# Patient Record
Sex: MALE | Race: WHITE | Employment: OTHER | URBAN - METROPOLITAN AREA
[De-identification: names, ages, dates, MRNs, and addresses within clinical notes are randomized per-mention and may not be internally consistent; named-entity substitution may affect disease eponyms.]

---

## 2017-01-06 PROBLEM — M65.331 TRIGGER FINGER, RIGHT MIDDLE FINGER: Status: ACTIVE | Noted: 2017-01-06

## 2017-01-30 PROBLEM — Z90.79 STATUS POST PROSTATECTOMY: Status: ACTIVE | Noted: 2017-01-30

## 2017-01-30 PROBLEM — Z85.46 HISTORY OF PROSTATE CANCER: Status: ACTIVE | Noted: 2017-01-30

## 2017-02-17 PROBLEM — R42 VERTIGO: Status: ACTIVE | Noted: 2017-02-17

## 2017-06-01 PROBLEM — M65.331 TRIGGER FINGER, RIGHT MIDDLE FINGER: Status: RESOLVED | Noted: 2017-01-06 | Resolved: 2017-06-01

## 2017-06-01 PROBLEM — Z85.46 HISTORY OF PROSTATE CANCER: Status: RESOLVED | Noted: 2017-01-30 | Resolved: 2017-06-01

## 2017-07-12 PROCEDURE — 87177 OVA AND PARASITES SMEARS: CPT | Performed by: INTERNAL MEDICINE

## 2017-07-12 PROCEDURE — 87269 GIARDIA AG IF: CPT | Performed by: INTERNAL MEDICINE

## 2017-07-12 PROCEDURE — 87046 STOOL CULTR AEROBIC BACT EA: CPT | Performed by: INTERNAL MEDICINE

## 2017-07-12 PROCEDURE — 87209 SMEAR COMPLEX STAIN: CPT | Performed by: INTERNAL MEDICINE

## 2017-07-12 PROCEDURE — 87045 FECES CULTURE AEROBIC BACT: CPT | Performed by: INTERNAL MEDICINE

## 2017-07-12 PROCEDURE — 87427 SHIGA-LIKE TOXIN AG IA: CPT | Performed by: INTERNAL MEDICINE

## 2018-03-08 PROBLEM — M75.102 ROTATOR CUFF SYNDROME OF LEFT SHOULDER: Status: ACTIVE | Noted: 2018-03-08

## 2019-03-11 PROCEDURE — 86803 HEPATITIS C AB TEST: CPT | Performed by: FAMILY MEDICINE

## 2019-04-03 PROCEDURE — 84403 ASSAY OF TOTAL TESTOSTERONE: CPT | Performed by: UROLOGY

## 2019-04-03 PROCEDURE — 36415 COLL VENOUS BLD VENIPUNCTURE: CPT | Performed by: UROLOGY

## 2019-04-03 PROCEDURE — 84402 ASSAY OF FREE TESTOSTERONE: CPT | Performed by: UROLOGY

## 2019-04-05 PROBLEM — E78.00 PURE HYPERCHOLESTEROLEMIA: Status: ACTIVE | Noted: 2019-04-05

## 2019-04-08 PROBLEM — N39.46 MIXED INCONTINENCE URGE AND STRESS (MALE)(FEMALE): Status: ACTIVE | Noted: 2019-04-08

## 2019-04-19 PROCEDURE — 84153 ASSAY OF PSA TOTAL: CPT | Performed by: RADIOLOGY

## 2019-04-19 PROCEDURE — 36415 COLL VENOUS BLD VENIPUNCTURE: CPT | Performed by: RADIOLOGY

## 2019-09-16 PROCEDURE — 84153 ASSAY OF PSA TOTAL: CPT | Performed by: RADIOLOGY

## 2019-09-16 PROCEDURE — 36415 COLL VENOUS BLD VENIPUNCTURE: CPT | Performed by: RADIOLOGY

## 2019-10-21 PROBLEM — M54.2 NECK PAIN: Status: ACTIVE | Noted: 2019-10-21

## 2019-12-18 PROCEDURE — 88305 TISSUE EXAM BY PATHOLOGIST: CPT | Performed by: INTERNAL MEDICINE

## 2021-05-08 ENCOUNTER — LAB ENCOUNTER (OUTPATIENT)
Dept: LAB | Facility: HOSPITAL | Age: 71
End: 2021-05-08
Attending: INTERNAL MEDICINE
Payer: MEDICARE

## 2021-05-08 DIAGNOSIS — Z45.010 PACEMAKER AT END OF BATTERY LIFE: ICD-10-CM

## 2021-05-11 ENCOUNTER — HOSPITAL ENCOUNTER (OUTPATIENT)
Dept: INTERVENTIONAL RADIOLOGY/VASCULAR | Facility: HOSPITAL | Age: 71
Discharge: HOME | End: 2021-05-11
Attending: INTERNAL MEDICINE | Admitting: INTERNAL MEDICINE
Payer: MEDICARE

## 2021-05-11 VITALS
RESPIRATION RATE: 16 BRPM | HEART RATE: 63 BPM | SYSTOLIC BLOOD PRESSURE: 154 MMHG | BODY MASS INDEX: 31.83 KG/M2 | WEIGHT: 235 LBS | HEIGHT: 72 IN | DIASTOLIC BLOOD PRESSURE: 73 MMHG | TEMPERATURE: 97 F | OXYGEN SATURATION: 99 %

## 2021-05-11 DIAGNOSIS — Z45.010 PACEMAKER AT END OF BATTERY LIFE: Primary | ICD-10-CM

## 2021-05-11 PROCEDURE — 99153 MOD SED SAME PHYS/QHP EA: CPT

## 2021-05-11 PROCEDURE — 0JPT0PZ REMOVAL OF CARDIAC RHYTHM RELATED DEVICE FROM TRUNK SUBCUTANEOUS TISSUE AND FASCIA, OPEN APPROACH: ICD-10-PCS | Performed by: INTERNAL MEDICINE

## 2021-05-11 PROCEDURE — 33228 REMV&REPLC PM GEN DUAL LEAD: CPT

## 2021-05-11 PROCEDURE — 0JH606Z INSERTION OF PACEMAKER, DUAL CHAMBER INTO CHEST SUBCUTANEOUS TISSUE AND FASCIA, OPEN APPROACH: ICD-10-PCS | Performed by: INTERNAL MEDICINE

## 2021-05-11 PROCEDURE — 99152 MOD SED SAME PHYS/QHP 5/>YRS: CPT

## 2021-05-11 RX ORDER — VANCOMYCIN HYDROCHLORIDE 1 G/20ML
INJECTION, POWDER, LYOPHILIZED, FOR SOLUTION INTRAVENOUS
Status: COMPLETED
Start: 2021-05-11 | End: 2021-05-11

## 2021-05-11 RX ORDER — CEFAZOLIN SODIUM/WATER 2 G/20 ML
SYRINGE (ML) INTRAVENOUS
Status: COMPLETED
Start: 2021-05-11 | End: 2021-05-11

## 2021-05-11 RX ORDER — CEFAZOLIN SODIUM/WATER 2 G/20 ML
2 SYRINGE (ML) INTRAVENOUS EVERY 8 HOURS
Status: DISCONTINUED | OUTPATIENT
Start: 2021-05-11 | End: 2021-05-11

## 2021-05-11 RX ORDER — MIDAZOLAM HYDROCHLORIDE 1 MG/ML
INJECTION INTRAMUSCULAR; INTRAVENOUS
Status: COMPLETED
Start: 2021-05-11 | End: 2021-05-11

## 2021-05-11 RX ORDER — CEFAZOLIN SODIUM/WATER 2 G/20 ML
2 SYRINGE (ML) INTRAVENOUS
Status: DISCONTINUED | OUTPATIENT
Start: 2021-05-11 | End: 2021-05-11 | Stop reason: HOSPADM

## 2021-05-11 RX ORDER — LIDOCAINE HYDROCHLORIDE 10 MG/ML
INJECTION, SOLUTION EPIDURAL; INFILTRATION; INTRACAUDAL; PERINEURAL
Status: COMPLETED
Start: 2021-05-11 | End: 2021-05-11

## 2021-05-11 RX ORDER — CHLORHEXIDINE GLUCONATE 4 G/100ML
30 SOLUTION TOPICAL
Status: COMPLETED | OUTPATIENT
Start: 2021-05-12 | End: 2021-05-11

## 2021-05-11 RX ORDER — ONDANSETRON 2 MG/ML
4 INJECTION INTRAMUSCULAR; INTRAVENOUS EVERY 6 HOURS PRN
Status: DISCONTINUED | OUTPATIENT
Start: 2021-05-11 | End: 2021-05-11

## 2021-05-11 RX ORDER — SODIUM CHLORIDE 9 MG/ML
INJECTION, SOLUTION INTRAVENOUS
Status: COMPLETED | OUTPATIENT
Start: 2021-05-12 | End: 2021-05-11

## 2021-05-11 RX ADMIN — CHLORHEXIDINE GLUCONATE 30 ML: 4 SOLUTION TOPICAL at 11:20:00

## 2021-05-11 RX ADMIN — SODIUM CHLORIDE: 9 INJECTION, SOLUTION INTRAVENOUS at 11:20:00

## 2021-05-11 NOTE — PLAN OF CARE
Pt post PPM generator change. Aquacel dressing to left upper chest c/d/i. VSS. Pt A&O x4, tolerating PO. Discharge instructions explained to pt & wife, verbalized understanding. Pt taken to [facility] entrance via wheelchair w/ all belongings on discharge.

## 2021-05-11 NOTE — PROCEDURES
Pacemaker Generator Change      History:  66year old male with sick sinus syndrome s/p pacemaker now at [facility] who presents for generator change.  The risks (including, but not limited to, hematoma, infection, pneumothorax, tamponade, renal failure from IV dy

## 2021-05-11 NOTE — H&P
Pre-op Diagnosis: * No pre-op diagnosis entered *    The above referenced H&P  (5/6/21 by me)was reviewed by Jaquan Wood MD on 5/11/2021, the patient was examined and no significant changes have occurred in the patient's condition since the H&P was

## 2021-05-18 PROBLEM — M25.551 RIGHT HIP PAIN: Status: ACTIVE | Noted: 2021-05-18

## 2021-09-27 ENCOUNTER — ORDER TRANSCRIPTION (OUTPATIENT)
Dept: ADMINISTRATIVE | Facility: HOSPITAL | Age: 71
End: 2021-09-27

## 2021-09-27 ENCOUNTER — LAB ENCOUNTER (OUTPATIENT)
Dept: LAB | Age: 71
End: 2021-09-27
Attending: PHYSICAL MEDICINE & REHABILITATION
Payer: MEDICARE

## 2021-09-27 DIAGNOSIS — Z01.818 PRE-PROCEDURAL EXAMINATION: ICD-10-CM

## 2021-09-27 DIAGNOSIS — Z11.59 SCREENING FOR VIRAL DISEASE: ICD-10-CM

## 2021-09-27 DIAGNOSIS — Z01.818 PRE-PROCEDURAL EXAMINATION: Primary | ICD-10-CM

## 2021-09-28 ENCOUNTER — HOSPITAL ENCOUNTER (OUTPATIENT)
Dept: GENERAL RADIOLOGY | Facility: HOSPITAL | Age: 71
Discharge: HOME | End: 2021-09-28
Attending: PHYSICAL MEDICINE & REHABILITATION
Payer: MEDICARE

## 2021-09-28 ENCOUNTER — HOSPITAL ENCOUNTER (OUTPATIENT)
Dept: CT IMAGING | Facility: HOSPITAL | Age: 71
Discharge: HOME | End: 2021-09-28
Attending: PHYSICAL MEDICINE & REHABILITATION
Payer: MEDICARE

## 2021-09-28 ENCOUNTER — NURSE ONLY (OUTPATIENT)
Dept: LAB | Facility: HOSPITAL | Age: 71
End: 2021-09-28
Attending: PHYSICAL MEDICINE & REHABILITATION
Payer: MEDICARE

## 2021-09-28 VITALS
TEMPERATURE: 97 F | OXYGEN SATURATION: 97 % | HEART RATE: 65 BPM | DIASTOLIC BLOOD PRESSURE: 91 MMHG | SYSTOLIC BLOOD PRESSURE: 166 MMHG | RESPIRATION RATE: 18 BRPM

## 2021-09-28 DIAGNOSIS — G89.29 CHRONIC RIGHT-SIDED LOW BACK PAIN WITH RIGHT-SIDED SCIATICA: ICD-10-CM

## 2021-09-28 DIAGNOSIS — G89.29 CHRONIC LOW BACK PAIN: Primary | ICD-10-CM

## 2021-09-28 DIAGNOSIS — G89.29 CHRONIC LOW BACK PAIN: ICD-10-CM

## 2021-09-28 DIAGNOSIS — M54.50 CHRONIC LOW BACK PAIN: Primary | ICD-10-CM

## 2021-09-28 DIAGNOSIS — M54.50 CHRONIC LOW BACK PAIN: ICD-10-CM

## 2021-09-28 DIAGNOSIS — M54.41 CHRONIC RIGHT-SIDED LOW BACK PAIN WITH RIGHT-SIDED SCIATICA: ICD-10-CM

## 2021-09-28 DIAGNOSIS — M48.062 NEUROGENIC CLAUDICATION DUE TO LUMBAR SPINAL STENOSIS: ICD-10-CM

## 2021-09-28 PROCEDURE — 36415 COLL VENOUS BLD VENIPUNCTURE: CPT

## 2021-09-28 PROCEDURE — 85027 COMPLETE CBC AUTOMATED: CPT | Performed by: RADIOLOGY

## 2021-09-28 PROCEDURE — 85610 PROTHROMBIN TIME: CPT

## 2021-09-28 RX ORDER — DIAZEPAM 5 MG/1
TABLET ORAL
Status: COMPLETED
Start: 2021-09-28 | End: 2021-09-28

## 2021-09-28 RX ORDER — SODIUM CHLORIDE 9 MG/ML
INJECTION, SOLUTION INTRAVENOUS CONTINUOUS
Status: DISCONTINUED | OUTPATIENT
Start: 2021-09-28 | End: 2021-09-30

## 2021-09-28 RX ORDER — DIAZEPAM 5 MG/1
10 TABLET ORAL
Status: COMPLETED | OUTPATIENT
Start: 2021-09-28 | End: 2021-09-28

## 2021-09-28 RX ADMIN — DIAZEPAM 10 MG: 5 TABLET ORAL at 07:46:00

## 2021-09-28 NOTE — IMAGING NOTE
Pt scheduled for myelogram with Dr. Yecenia Conn. Took valium as ordered. Confirmed ride home. Report called to Naresh Bolaños in [employer].

## 2021-10-01 VITALS — BODY MASS INDEX: 31.15 KG/M2 | HEIGHT: 72 IN | WEIGHT: 230 LBS

## 2021-10-02 ENCOUNTER — LAB ENCOUNTER (OUTPATIENT)
Dept: LAB | Age: 71
End: 2021-10-02
Attending: PHYSICAL MEDICINE & REHABILITATION
Payer: MEDICARE

## 2021-10-02 DIAGNOSIS — Z11.59 ENCOUNTER FOR SCREENING FOR OTHER VIRAL DISEASES: ICD-10-CM

## 2021-10-02 DIAGNOSIS — Z01.818 PREOP EXAMINATION: ICD-10-CM

## 2021-10-04 ENCOUNTER — HOSPITAL ENCOUNTER (OUTPATIENT)
Dept: GENERAL RADIOLOGY | Facility: HOSPITAL | Age: 71
Discharge: HOME | End: 2021-10-04
Attending: PHYSICAL MEDICINE & REHABILITATION
Payer: MEDICARE

## 2021-10-04 ENCOUNTER — HOSPITAL ENCOUNTER (OUTPATIENT)
Dept: CT IMAGING | Facility: HOSPITAL | Age: 71
Discharge: HOME | End: 2021-10-04
Attending: PHYSICAL MEDICINE & REHABILITATION
Payer: MEDICARE

## 2021-10-04 ENCOUNTER — ANESTHESIA (OUTPATIENT)
Dept: GENERAL RADIOLOGY | Facility: HOSPITAL | Age: 71
End: 2021-10-04
Payer: MEDICARE

## 2021-10-04 ENCOUNTER — ANESTHESIA EVENT (OUTPATIENT)
Dept: GENERAL RADIOLOGY | Facility: HOSPITAL | Age: 71
End: 2021-10-04
Payer: MEDICARE

## 2021-10-04 VITALS
TEMPERATURE: 99 F | DIASTOLIC BLOOD PRESSURE: 81 MMHG | OXYGEN SATURATION: 96 % | BODY MASS INDEX: 31.83 KG/M2 | HEIGHT: 72 IN | HEART RATE: 65 BPM | RESPIRATION RATE: 18 BRPM | SYSTOLIC BLOOD PRESSURE: 159 MMHG | WEIGHT: 235 LBS

## 2021-10-04 DIAGNOSIS — M48.061 SPINAL STENOSIS OF LUMBAR REGION, UNSPECIFIED WHETHER NEUROGENIC CLAUDICATION PRESENT: ICD-10-CM

## 2021-10-04 DIAGNOSIS — G89.29 CHRONIC RIGHT-SIDED LOW BACK PAIN WITH RIGHT-SIDED SCIATICA: ICD-10-CM

## 2021-10-04 DIAGNOSIS — M50.30 DDD (DEGENERATIVE DISC DISEASE), CERVICAL: ICD-10-CM

## 2021-10-04 DIAGNOSIS — M54.41 CHRONIC RIGHT-SIDED LOW BACK PAIN WITH RIGHT-SIDED SCIATICA: ICD-10-CM

## 2021-10-04 PROCEDURE — 72132 CT LUMBAR SPINE W/DYE: CPT | Performed by: PHYSICAL MEDICINE & REHABILITATION

## 2021-10-04 PROCEDURE — 62304 MYELOGRAPHY LUMBAR INJECTION: CPT | Performed by: PHYSICAL MEDICINE & REHABILITATION

## 2021-10-04 RX ORDER — SODIUM CHLORIDE, SODIUM LACTATE, POTASSIUM CHLORIDE, CALCIUM CHLORIDE 600; 310; 30; 20 MG/100ML; MG/100ML; MG/100ML; MG/100ML
INJECTION, SOLUTION INTRAVENOUS CONTINUOUS
Status: DISCONTINUED | OUTPATIENT
Start: 2021-10-04 | End: 2021-10-06

## 2021-10-04 RX ORDER — ACETAMINOPHEN 500 MG
1000 TABLET ORAL ONCE
Status: DISCONTINUED | OUTPATIENT
Start: 2021-10-04 | End: 2021-10-06

## 2021-10-04 RX ORDER — HYDROCODONE BITARTRATE AND ACETAMINOPHEN 5; 325 MG/1; MG/1
1 TABLET ORAL AS NEEDED
Status: DISCONTINUED | OUTPATIENT
Start: 2021-10-04 | End: 2021-10-06

## 2021-10-04 RX ORDER — HYDROMORPHONE HYDROCHLORIDE 1 MG/ML
0.2 INJECTION, SOLUTION INTRAMUSCULAR; INTRAVENOUS; SUBCUTANEOUS EVERY 5 MIN PRN
Status: DISCONTINUED | OUTPATIENT
Start: 2021-10-04 | End: 2021-10-06

## 2021-10-04 RX ORDER — METOCLOPRAMIDE 10 MG/1
10 TABLET ORAL ONCE
Status: DISCONTINUED | OUTPATIENT
Start: 2021-10-04 | End: 2021-10-06

## 2021-10-04 RX ORDER — HYDROCODONE BITARTRATE AND ACETAMINOPHEN 5; 325 MG/1; MG/1
2 TABLET ORAL AS NEEDED
Status: DISCONTINUED | OUTPATIENT
Start: 2021-10-04 | End: 2021-10-06

## 2021-10-04 RX ORDER — HYDROMORPHONE HYDROCHLORIDE 1 MG/ML
0.4 INJECTION, SOLUTION INTRAMUSCULAR; INTRAVENOUS; SUBCUTANEOUS EVERY 5 MIN PRN
Status: DISCONTINUED | OUTPATIENT
Start: 2021-10-04 | End: 2021-10-06

## 2021-10-04 RX ORDER — MORPHINE SULFATE 4 MG/ML
2 INJECTION, SOLUTION INTRAMUSCULAR; INTRAVENOUS EVERY 10 MIN PRN
Status: DISCONTINUED | OUTPATIENT
Start: 2021-10-04 | End: 2021-10-06

## 2021-10-04 RX ORDER — LIDOCAINE HYDROCHLORIDE 10 MG/ML
INJECTION, SOLUTION EPIDURAL; INFILTRATION; INTRACAUDAL; PERINEURAL AS NEEDED
Status: DISCONTINUED | OUTPATIENT
Start: 2021-10-04 | End: 2021-10-04 | Stop reason: SURG

## 2021-10-04 RX ORDER — METOPROLOL TARTRATE 5 MG/5ML
2.5 INJECTION INTRAVENOUS ONCE
Status: DISCONTINUED | OUTPATIENT
Start: 2021-10-04 | End: 2021-10-06

## 2021-10-04 RX ORDER — MORPHINE SULFATE 4 MG/ML
4 INJECTION, SOLUTION INTRAMUSCULAR; INTRAVENOUS EVERY 10 MIN PRN
Status: DISCONTINUED | OUTPATIENT
Start: 2021-10-04 | End: 2021-10-06

## 2021-10-04 RX ORDER — HALOPERIDOL 5 MG/ML
0.25 INJECTION INTRAMUSCULAR ONCE AS NEEDED
Status: ACTIVE | OUTPATIENT
Start: 2021-10-04 | End: 2021-10-04

## 2021-10-04 RX ORDER — HYDROMORPHONE HYDROCHLORIDE 1 MG/ML
0.6 INJECTION, SOLUTION INTRAMUSCULAR; INTRAVENOUS; SUBCUTANEOUS EVERY 5 MIN PRN
Status: DISCONTINUED | OUTPATIENT
Start: 2021-10-04 | End: 2021-10-06

## 2021-10-04 RX ORDER — FAMOTIDINE 20 MG/1
20 TABLET ORAL ONCE
Status: DISCONTINUED | OUTPATIENT
Start: 2021-10-04 | End: 2021-10-06

## 2021-10-04 RX ORDER — MORPHINE SULFATE 10 MG/ML
6 INJECTION, SOLUTION INTRAMUSCULAR; INTRAVENOUS EVERY 10 MIN PRN
Status: DISCONTINUED | OUTPATIENT
Start: 2021-10-04 | End: 2021-10-06

## 2021-10-04 RX ORDER — ONDANSETRON 2 MG/ML
4 INJECTION INTRAMUSCULAR; INTRAVENOUS ONCE AS NEEDED
Status: ACTIVE | OUTPATIENT
Start: 2021-10-04 | End: 2021-10-04

## 2021-10-04 RX ORDER — NALOXONE HYDROCHLORIDE 0.4 MG/ML
80 INJECTION, SOLUTION INTRAMUSCULAR; INTRAVENOUS; SUBCUTANEOUS AS NEEDED
Status: ACTIVE | OUTPATIENT
Start: 2021-10-04 | End: 2021-10-04

## 2021-10-04 RX ORDER — PROCHLORPERAZINE EDISYLATE 5 MG/ML
5 INJECTION INTRAMUSCULAR; INTRAVENOUS ONCE AS NEEDED
Status: ACTIVE | OUTPATIENT
Start: 2021-10-04 | End: 2021-10-04

## 2021-10-04 RX ADMIN — SODIUM CHLORIDE, SODIUM LACTATE, POTASSIUM CHLORIDE, CALCIUM CHLORIDE: 600; 310; 30; 20 INJECTION, SOLUTION INTRAVENOUS at 13:36:00

## 2021-10-04 RX ADMIN — LIDOCAINE HYDROCHLORIDE 50 MG: 10 INJECTION, SOLUTION EPIDURAL; INFILTRATION; INTRACAUDAL; PERINEURAL at 13:42:00

## 2021-10-04 RX ADMIN — SODIUM CHLORIDE, SODIUM LACTATE, POTASSIUM CHLORIDE, CALCIUM CHLORIDE: 600; 310; 30; 20 INJECTION, SOLUTION INTRAVENOUS at 14:15:00

## 2021-10-04 RX ADMIN — SODIUM CHLORIDE, SODIUM LACTATE, POTASSIUM CHLORIDE, CALCIUM CHLORIDE: 600; 310; 30; 20 INJECTION, SOLUTION INTRAVENOUS at 14:20:00

## 2021-10-04 NOTE — ANESTHESIA POSTPROCEDURE EVALUATION
Patient: Alyssa Flores    Procedure Summary     Date: 10/04/21 Room / Location: [address] [place] [address]; [address] [place] [address] Anesthesia Care Unit    Anesthesia Start: 1112 Anesthesia Stop: 8250    Procedure: XR MYELOGRAM LUMBAR WITH INJECTION  (CPT=6230

## 2021-10-04 NOTE — ANESTHESIA PREPROCEDURE EVALUATION
Anesthesia PreOp Note    HPI:     Hazel Gomez is a 66year old male who presents for preoperative consultation requested by: * No surgeons listed *    Date of Surgery: 10/4/2021    * No procedures listed *  Indication: * No pre-op diagnosis entered * 12/10/2010      ED (erectile dysfunction)         Date Noted: 07/23/2010      Gout         Date Noted: 04/05/2010      Cardiac pacemaker Medtronic         Date Noted: 12/30/2008      Essential hypertension, benign         Date Noted: 12/30/2008      SSS (s SUBSTANCE, EPIDURAL/SUBARACHNOID; LUMBAR/SACRAL N/A 3/3/2016    Procedure: LUMBAR EPIDURAL;  Surgeon: Charyl Hatchet, MD;  Location: [facility]   • INJECTION, W/WO CONTRAST, DX/THERAPEUTIC SUBSTANCE, EPIDURAL/SUBARACHNOID; LUMBAR/SACR EVERY MORNING, Disp: 90 capsule, Rfl: 3  lisinopril 40 MG Oral Tab, TAKE 1 TABLET BY MOUTH EVERY DAY, Disp: 90 tablet, Rfl: 3  POTASSIUM CHLORIDE ER 8 MEQ Oral Tab CR, TAKE 1 TABLET(8 MEQ) BY MOUTH EVERY DAY, Disp: 90 tablet, Rfl: 3  HYDRALAZINE  MG age 79   • Heart Disorder Brother         CABG 35   • Heart Disorder Paternal Uncle         MI   • Diabetes Sister    • Hypertension Brother    • Hypertension Brother      Social History    Socioeconomic History      Marital status:       Spo file  Intimate Partner Violence:       Fear of Current or Ex-Partner: Not on file      Emotionally Abused: Not on file      Physically Abused: Not on file      Sexually Abused: Not on file  Housing Stability:       Unable to Pay for Housing in the [address] [place] [address] Drive valve: Trileaflet; mildly thickened, mildly calcified leaflets.      Peak velocity (S): 1.1m/sec. 3. Aorta: Aortic root dimension: 4.0cm (ED). Ascending aorta diameter: 4.0cm      (ED). 4. Mitral valve: Mildly calcified annulus.  There is mild regurgita

## 2021-12-06 PROBLEM — E78.00 PURE HYPERCHOLESTEROLEMIA: Status: RESOLVED | Noted: 2019-04-05 | Resolved: 2021-12-06

## 2021-12-20 ENCOUNTER — LAB ENCOUNTER (OUTPATIENT)
Dept: LAB | Age: 71
End: 2021-12-20
Attending: ORTHOPAEDIC SURGERY
Payer: MEDICARE

## 2021-12-20 DIAGNOSIS — Z01.818 PREOP TESTING: ICD-10-CM

## 2022-01-06 NOTE — H&P (VIEW-ONLY)
Patient: David Jarquin  Medical Record Number: [id number]  Referring Physician:  No ref.  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 Dre Iqbal.  Cyndra Lundborg, MD, Rogerio Adamson of CONTRAST, DX/THERAPEUTIC SUBSTANCE, EPIDURAL/SUBARACHNOID; LUMBAR/SACRAL N/A 3/3/2016    Procedure: LUMBAR EPIDURAL;  Surgeon: Alberto Malloy MD;  Location: [facility]   • INJECTION, W/WO CONTRAST, DX/THERAPEUTIC SUBSTANCE, EPIDURAL/ Current Medications:  Current Outpatient Medications   Medication Sig Dispense Refill   • amoxicillin 500 MG Oral Cap Take 1 capsule (500 mg total) by mouth 3 (three) times daily for 10 days.  30 capsule 0   • LABETALOL 200 MG Oral Tab TAKE 2 TABLETS(

## 2022-01-07 ENCOUNTER — LAB ENCOUNTER (OUTPATIENT)
Dept: LAB | Age: 72
End: 2022-01-07
Attending: ORTHOPAEDIC SURGERY
Payer: MEDICARE

## 2022-01-07 DIAGNOSIS — Z01.818 PRE-OP TESTING: ICD-10-CM

## 2022-01-08 LAB — SARS-COV-2 RNA RESP QL NAA+PROBE: NOT DETECTED

## 2022-01-10 ENCOUNTER — APPOINTMENT (OUTPATIENT)
Dept: GENERAL RADIOLOGY | Facility: HOSPITAL | Age: 72
End: 2022-01-10
Attending: ORTHOPAEDIC SURGERY
Payer: MEDICARE

## 2022-01-10 ENCOUNTER — ANESTHESIA (OUTPATIENT)
Dept: SURGERY | Facility: HOSPITAL | Age: 72
End: 2022-01-10
Payer: MEDICARE

## 2022-01-10 ENCOUNTER — HOSPITAL ENCOUNTER (OUTPATIENT)
Facility: HOSPITAL | Age: 72
Discharge: HOME | End: 2022-01-11
Attending: ORTHOPAEDIC SURGERY | Admitting: ORTHOPAEDIC SURGERY
Payer: MEDICARE

## 2022-01-10 ENCOUNTER — ANESTHESIA EVENT (OUTPATIENT)
Dept: SURGERY | Facility: HOSPITAL | Age: 72
End: 2022-01-10
Payer: MEDICARE

## 2022-01-10 DIAGNOSIS — M48.061 SPINAL STENOSIS OF LUMBAR REGION, UNSPECIFIED WHETHER NEUROGENIC CLAUDICATION PRESENT: ICD-10-CM

## 2022-01-10 DIAGNOSIS — Z01.818 PRE-OP TESTING: Primary | ICD-10-CM

## 2022-01-10 PROBLEM — R20.0 NUMBNESS AND TINGLING OF RIGHT LOWER EXTREMITY: Status: ACTIVE | Noted: 2022-01-10

## 2022-01-10 PROBLEM — R20.2 NUMBNESS AND TINGLING OF RIGHT LOWER EXTREMITY: Status: ACTIVE | Noted: 2022-01-10

## 2022-01-10 PROCEDURE — 97530 THERAPEUTIC ACTIVITIES: CPT

## 2022-01-10 PROCEDURE — 01NB0ZZ RELEASE LUMBAR NERVE, OPEN APPROACH: ICD-10-PCS | Performed by: ORTHOPAEDIC SURGERY

## 2022-01-10 PROCEDURE — 97162 PT EVAL MOD COMPLEX 30 MIN: CPT

## 2022-01-10 PROCEDURE — 76000 FLUOROSCOPY <1 HR PHYS/QHP: CPT | Performed by: ORTHOPAEDIC SURGERY

## 2022-01-10 RX ORDER — METOCLOPRAMIDE 10 MG/1
10 TABLET ORAL ONCE
Status: COMPLETED | OUTPATIENT
Start: 2022-01-10 | End: 2022-01-10

## 2022-01-10 RX ORDER — ROCURONIUM BROMIDE 10 MG/ML
INJECTION, SOLUTION INTRAVENOUS AS NEEDED
Status: DISCONTINUED | OUTPATIENT
Start: 2022-01-10 | End: 2022-01-10 | Stop reason: SURG

## 2022-01-10 RX ORDER — MORPHINE SULFATE 10 MG/ML
6 INJECTION, SOLUTION INTRAMUSCULAR; INTRAVENOUS EVERY 10 MIN PRN
Status: DISCONTINUED | OUTPATIENT
Start: 2022-01-10 | End: 2022-01-10 | Stop reason: HOSPADM

## 2022-01-10 RX ORDER — NALOXONE HYDROCHLORIDE 0.4 MG/ML
80 INJECTION, SOLUTION INTRAMUSCULAR; INTRAVENOUS; SUBCUTANEOUS AS NEEDED
Status: DISCONTINUED | OUTPATIENT
Start: 2022-01-10 | End: 2022-01-10 | Stop reason: HOSPADM

## 2022-01-10 RX ORDER — MELATONIN
2000 DAILY
Status: DISCONTINUED | OUTPATIENT
Start: 2022-01-11 | End: 2022-01-11

## 2022-01-10 RX ORDER — MORPHINE SULFATE 4 MG/ML
2 INJECTION, SOLUTION INTRAMUSCULAR; INTRAVENOUS EVERY 10 MIN PRN
Status: DISCONTINUED | OUTPATIENT
Start: 2022-01-10 | End: 2022-01-10 | Stop reason: HOSPADM

## 2022-01-10 RX ORDER — HYDROMORPHONE HYDROCHLORIDE 1 MG/ML
0.6 INJECTION, SOLUTION INTRAMUSCULAR; INTRAVENOUS; SUBCUTANEOUS EVERY 5 MIN PRN
Status: DISCONTINUED | OUTPATIENT
Start: 2022-01-10 | End: 2022-01-10 | Stop reason: HOSPADM

## 2022-01-10 RX ORDER — ALBUTEROL SULFATE 90 UG/1
2 AEROSOL, METERED RESPIRATORY (INHALATION) EVERY 4 HOURS PRN
Status: DISCONTINUED | OUTPATIENT
Start: 2022-01-10 | End: 2022-01-11

## 2022-01-10 RX ORDER — DEXAMETHASONE SODIUM PHOSPHATE 4 MG/ML
VIAL (ML) INJECTION AS NEEDED
Status: DISCONTINUED | OUTPATIENT
Start: 2022-01-10 | End: 2022-01-10 | Stop reason: SURG

## 2022-01-10 RX ORDER — LIDOCAINE HYDROCHLORIDE 10 MG/ML
INJECTION, SOLUTION EPIDURAL; INFILTRATION; INTRACAUDAL; PERINEURAL AS NEEDED
Status: DISCONTINUED | OUTPATIENT
Start: 2022-01-10 | End: 2022-01-10 | Stop reason: SURG

## 2022-01-10 RX ORDER — HYDROCODONE BITARTRATE AND ACETAMINOPHEN 5; 325 MG/1; MG/1
1 TABLET ORAL AS NEEDED
Status: DISCONTINUED | OUTPATIENT
Start: 2022-01-10 | End: 2022-01-10 | Stop reason: HOSPADM

## 2022-01-10 RX ORDER — LABETALOL 200 MG/1
400 TABLET, FILM COATED ORAL EVERY 12 HOURS SCHEDULED
Status: DISCONTINUED | OUTPATIENT
Start: 2022-01-10 | End: 2022-01-11

## 2022-01-10 RX ORDER — GLYCOPYRROLATE 0.2 MG/ML
INJECTION, SOLUTION INTRAMUSCULAR; INTRAVENOUS AS NEEDED
Status: DISCONTINUED | OUTPATIENT
Start: 2022-01-10 | End: 2022-01-10 | Stop reason: SURG

## 2022-01-10 RX ORDER — FEBUXOSTAT 40 MG/1
40 TABLET, FILM COATED ORAL DAILY
Status: DISCONTINUED | OUTPATIENT
Start: 2022-01-11 | End: 2022-01-11

## 2022-01-10 RX ORDER — CEFAZOLIN SODIUM/WATER 2 G/20 ML
2 SYRINGE (ML) INTRAVENOUS ONCE
Status: COMPLETED | OUTPATIENT
Start: 2022-01-10 | End: 2022-01-10

## 2022-01-10 RX ORDER — HYDRALAZINE HYDROCHLORIDE 100 MG/1
100 TABLET, FILM COATED ORAL DAILY
Status: DISCONTINUED | OUTPATIENT
Start: 2022-01-11 | End: 2022-01-11

## 2022-01-10 RX ORDER — PROCHLORPERAZINE EDISYLATE 5 MG/ML
5 INJECTION INTRAMUSCULAR; INTRAVENOUS ONCE AS NEEDED
Status: DISCONTINUED | OUTPATIENT
Start: 2022-01-10 | End: 2022-01-10 | Stop reason: HOSPADM

## 2022-01-10 RX ORDER — ROSUVASTATIN CALCIUM 20 MG/1
20 TABLET, COATED ORAL NIGHTLY
Status: DISCONTINUED | OUTPATIENT
Start: 2022-01-10 | End: 2022-01-11

## 2022-01-10 RX ORDER — TIZANIDINE 2 MG/1
4 TABLET ORAL EVERY 6 HOURS PRN
Status: DISCONTINUED | OUTPATIENT
Start: 2022-01-10 | End: 2022-01-11

## 2022-01-10 RX ORDER — GABAPENTIN 300 MG/1
300 CAPSULE ORAL 3 TIMES DAILY
Status: DISCONTINUED | OUTPATIENT
Start: 2022-01-10 | End: 2022-01-11

## 2022-01-10 RX ORDER — PHENYLEPHRINE HCL 10 MG/ML
VIAL (ML) INJECTION AS NEEDED
Status: DISCONTINUED | OUTPATIENT
Start: 2022-01-10 | End: 2022-01-10 | Stop reason: SURG

## 2022-01-10 RX ORDER — ONDANSETRON 2 MG/ML
INJECTION INTRAMUSCULAR; INTRAVENOUS AS NEEDED
Status: DISCONTINUED | OUTPATIENT
Start: 2022-01-10 | End: 2022-01-10 | Stop reason: SURG

## 2022-01-10 RX ORDER — METOPROLOL TARTRATE 5 MG/5ML
2.5 INJECTION INTRAVENOUS ONCE
Status: DISCONTINUED | OUTPATIENT
Start: 2022-01-10 | End: 2022-01-10 | Stop reason: HOSPADM

## 2022-01-10 RX ORDER — EPHEDRINE SULFATE 50 MG/ML
INJECTION, SOLUTION INTRAVENOUS AS NEEDED
Status: DISCONTINUED | OUTPATIENT
Start: 2022-01-10 | End: 2022-01-10 | Stop reason: SURG

## 2022-01-10 RX ORDER — HYDROCODONE BITARTRATE AND ACETAMINOPHEN 5; 325 MG/1; MG/1
2 TABLET ORAL AS NEEDED
Status: DISCONTINUED | OUTPATIENT
Start: 2022-01-10 | End: 2022-01-10 | Stop reason: HOSPADM

## 2022-01-10 RX ORDER — HYDRALAZINE HYDROCHLORIDE 20 MG/ML
20 INJECTION INTRAMUSCULAR; INTRAVENOUS ONCE
Status: COMPLETED | OUTPATIENT
Start: 2022-01-10 | End: 2022-01-10

## 2022-01-10 RX ORDER — SCOLOPAMINE TRANSDERMAL SYSTEM 1 MG/1
1 PATCH, EXTENDED RELEASE TRANSDERMAL ONCE
Status: DISCONTINUED | OUTPATIENT
Start: 2022-01-10 | End: 2022-01-11

## 2022-01-10 RX ORDER — HYDROMORPHONE HYDROCHLORIDE 1 MG/ML
0.2 INJECTION, SOLUTION INTRAMUSCULAR; INTRAVENOUS; SUBCUTANEOUS EVERY 5 MIN PRN
Status: DISCONTINUED | OUTPATIENT
Start: 2022-01-10 | End: 2022-01-10 | Stop reason: HOSPADM

## 2022-01-10 RX ORDER — HYDROMORPHONE HYDROCHLORIDE 1 MG/ML
0.4 INJECTION, SOLUTION INTRAMUSCULAR; INTRAVENOUS; SUBCUTANEOUS EVERY 5 MIN PRN
Status: DISCONTINUED | OUTPATIENT
Start: 2022-01-10 | End: 2022-01-10 | Stop reason: HOSPADM

## 2022-01-10 RX ORDER — SODIUM CHLORIDE, SODIUM LACTATE, POTASSIUM CHLORIDE, CALCIUM CHLORIDE 600; 310; 30; 20 MG/100ML; MG/100ML; MG/100ML; MG/100ML
INJECTION, SOLUTION INTRAVENOUS CONTINUOUS
Status: DISCONTINUED | OUTPATIENT
Start: 2022-01-10 | End: 2022-01-11

## 2022-01-10 RX ORDER — DOCUSATE SODIUM 100 MG/1
100 CAPSULE, LIQUID FILLED ORAL 2 TIMES DAILY
Qty: 20 CAPSULE | Refills: 0 | Status: SHIPPED | OUTPATIENT
Start: 2022-01-10

## 2022-01-10 RX ORDER — ONDANSETRON 2 MG/ML
4 INJECTION INTRAMUSCULAR; INTRAVENOUS ONCE AS NEEDED
Status: DISCONTINUED | OUTPATIENT
Start: 2022-01-10 | End: 2022-01-10 | Stop reason: HOSPADM

## 2022-01-10 RX ORDER — ACETAMINOPHEN 500 MG
500 TABLET ORAL EVERY 6 HOURS PRN
Status: DISCONTINUED | OUTPATIENT
Start: 2022-01-10 | End: 2022-01-11

## 2022-01-10 RX ORDER — CLONIDINE HYDROCHLORIDE 0.1 MG/1
0.1 TABLET ORAL EVERY 6 HOURS PRN
Status: DISCONTINUED | OUTPATIENT
Start: 2022-01-10 | End: 2022-01-11

## 2022-01-10 RX ORDER — FAMOTIDINE 20 MG/1
20 TABLET ORAL ONCE
Status: COMPLETED | OUTPATIENT
Start: 2022-01-10 | End: 2022-01-10

## 2022-01-10 RX ORDER — ACETAMINOPHEN 500 MG
1000 TABLET ORAL EVERY 6 HOURS PRN
Status: DISCONTINUED | OUTPATIENT
Start: 2022-01-10 | End: 2022-01-11

## 2022-01-10 RX ORDER — ACETAMINOPHEN 500 MG
1000 TABLET ORAL ONCE
Status: COMPLETED | OUTPATIENT
Start: 2022-01-10 | End: 2022-01-10

## 2022-01-10 RX ORDER — MORPHINE SULFATE 4 MG/ML
4 INJECTION, SOLUTION INTRAMUSCULAR; INTRAVENOUS EVERY 10 MIN PRN
Status: DISCONTINUED | OUTPATIENT
Start: 2022-01-10 | End: 2022-01-10 | Stop reason: HOSPADM

## 2022-01-10 RX ORDER — NEOSTIGMINE METHYLSULFATE 1 MG/ML
INJECTION INTRAVENOUS AS NEEDED
Status: DISCONTINUED | OUTPATIENT
Start: 2022-01-10 | End: 2022-01-10 | Stop reason: SURG

## 2022-01-10 RX ORDER — OXYCODONE HYDROCHLORIDE 5 MG/1
10 TABLET ORAL ONCE
Status: COMPLETED | OUTPATIENT
Start: 2022-01-10 | End: 2022-01-10

## 2022-01-10 RX ORDER — CELECOXIB 200 MG/1
200 CAPSULE ORAL DAILY
Qty: 30 CAPSULE | Refills: 0 | Status: SHIPPED | OUTPATIENT
Start: 2022-01-10

## 2022-01-10 RX ORDER — TIZANIDINE HYDROCHLORIDE 4 MG/1
4 CAPSULE, GELATIN COATED ORAL 3 TIMES DAILY
Qty: 30 CAPSULE | Refills: 0 | Status: SHIPPED | OUTPATIENT
Start: 2022-01-10

## 2022-01-10 RX ADMIN — EPHEDRINE SULFATE 10 MG: 50 INJECTION, SOLUTION INTRAVENOUS at 11:54:00

## 2022-01-10 RX ADMIN — DEXAMETHASONE SODIUM PHOSPHATE 4 MG: 4 MG/ML VIAL (ML) INJECTION at 11:45:00

## 2022-01-10 RX ADMIN — NEOSTIGMINE METHYLSULFATE 4 MG: 1 INJECTION INTRAVENOUS at 13:25:00

## 2022-01-10 RX ADMIN — EPHEDRINE SULFATE 10 MG: 50 INJECTION, SOLUTION INTRAVENOUS at 11:50:00

## 2022-01-10 RX ADMIN — ROCURONIUM BROMIDE 50 MG: 10 INJECTION, SOLUTION INTRAVENOUS at 11:36:00

## 2022-01-10 RX ADMIN — PHENYLEPHRINE HCL 100 MCG: 10 MG/ML VIAL (ML) INJECTION at 12:33:00

## 2022-01-10 RX ADMIN — GLYCOPYRROLATE 0.6 MG: 0.2 INJECTION, SOLUTION INTRAMUSCULAR; INTRAVENOUS at 13:25:00

## 2022-01-10 RX ADMIN — SODIUM CHLORIDE, SODIUM LACTATE, POTASSIUM CHLORIDE, CALCIUM CHLORIDE: 600; 310; 30; 20 INJECTION, SOLUTION INTRAVENOUS at 13:45:00

## 2022-01-10 RX ADMIN — ONDANSETRON 4 MG: 2 INJECTION INTRAMUSCULAR; INTRAVENOUS at 13:20:00

## 2022-01-10 RX ADMIN — CEFAZOLIN SODIUM/WATER 2 G: 2 G/20 ML SYRINGE (ML) INTRAVENOUS at 11:40:00

## 2022-01-10 RX ADMIN — LIDOCAINE HYDROCHLORIDE 50 MG: 10 INJECTION, SOLUTION EPIDURAL; INFILTRATION; INTRACAUDAL; PERINEURAL at 11:36:00

## 2022-01-10 RX ADMIN — EPHEDRINE SULFATE 10 MG: 50 INJECTION, SOLUTION INTRAVENOUS at 12:08:00

## 2022-01-10 RX ADMIN — PHENYLEPHRINE HCL 100 MCG: 10 MG/ML VIAL (ML) INJECTION at 12:21:00

## 2022-01-10 RX ADMIN — EPHEDRINE SULFATE 10 MG: 50 INJECTION, SOLUTION INTRAVENOUS at 12:16:00

## 2022-01-10 NOTE — INTERVAL H&P NOTE
Pre-op Diagnosis: Spinal stenosis of lumbar region, unspecified whether neurogenic claudication present [M48.061]    The above referenced H&P was reviewed by Sherryle Rous, MD on 1/10/2022, the patient was examined and no significant changes have occu

## 2022-01-10 NOTE — ANESTHESIA POSTPROCEDURE EVALUATION
Patient: Olivia Vanegas    Procedure Summary     Date: 01/10/22 Room / Location: [facility] OR 09 / [facility] OR    Anesthesia Start: 1809 Anesthesia Stop: 7483    Procedure: MICRODECOMPRESSION LUMBAR 5-SACRUM 1 RIGHT SIDE (Right Spine Lumbar) Diagnosis:

## 2022-01-10 NOTE — ANESTHESIA PROCEDURE NOTES
Airway  Date/Time: 1/10/2022 11:38 AM  Urgency: Elective      General Information and Staff    Patient location during procedure: OR  Anesthesiologist: Harriet Herrera MD  Resident/CRNA: Quinton Saenz CRNA  Performed: CRNA     Indications and Patient Co

## 2022-01-10 NOTE — OPERATIVE REPORT
Operative Note    DOS: 1/10/2022  Patient Name: Cleveland Mccarty  Preoperative Diagnosis:   1.) Lumbar Radiculopathy  2.) Lumbar Spondylosis, stenosis  Postoperative Diagnosis: Same  Primary Surgeon: Martha Saini MD  Assistant: Du Marks SA antibiotics per SCIP guidelines. After successful induction of general endotracheal anesthesia, and sequential compression devices were applied to bilateral lower extremities. The patient was then placed in the prone position on the Karel spine frame. Marcaine and the skin was closed with a Monocryl suture. Steri-Strips and a sterile dressing were then applied. The patient was woken from anesthesia and transferred to the PACU in stable condition.   All counts were correct x2 at the end of the procedure

## 2022-01-10 NOTE — ANESTHESIA PREPROCEDURE EVALUATION
Anesthesia PreOp Note    HPI:     Gerald Dee is a 66year old male who presents for preoperative consultation requested by: Adam Thomas MD    Date of Surgery: 1/10/2022    Procedure(s):   MICRODECOMPRESSION LUMBAR 5-SACRUM 1 RIGHT SIDE  Soraida and jessy         Date Noted: 12/10/2010      ED (erectile dysfunction)         Date Noted: 07/23/2010      Gout         Date Noted: 04/05/2010      Cardiac pacemaker Medtronic         Date Noted: 12/30/2008      Essential hypertension, benign         Sukhdev INJECTION, W/WO CONTRAST, DX/THERAPEUTIC SUBSTANCE, EPIDURAL/SUBARACHNOID; LUMBAR/SACRAL N/A 3/3/2016    Procedure: LUMBAR EPIDURAL;  Surgeon: Lauralyn Dandy, MD;  Location: [facility]   • INJECTION, W/WO CONTRAST, DX/THERAPEUTIC SUBS 1/9/2022 at 2200  GABAPENTIN 300 MG Oral Cap, TAKE 1 CAPSULE(300 MG) BY MOUTH THREE TIMES DAILY, Disp: 90 capsule, Rfl: 2, 1/9/2022 at 2200  rosuvastatin 20 MG Oral Tab, Take 1 tablet (20 mg total) by mouth nightly., Disp: 90 tablet, Rfl: 3, 1/9/2022 at 59 Diabetes Mother    • Heart Disorder Mother         CHF   • Other (Other) Mother         age 80   • Heart Disorder Brother         CABG 35   • Heart Disorder Paternal Uncle         MI   • Diabetes Sister    • Hypertension Brother    • Hypertension Brother weight is 112.5 kg (248 lb). His oral temperature is 97.3 °F (36.3 °C). His blood pressure is 170/106 (abnormal) and his pulse is 78. His respiration is 18 and oxygen saturation is 95%.     01/10/22  0810 01/10/22  0813 01/10/22  0816 01/10/22  0832   BP: (

## 2022-01-10 NOTE — PHYSICAL THERAPY NOTE
PHYSICAL THERAPY EVALUATION - INPATIENT     [facility]  Evaluation Date: 1/10/2022  Type of Evaluation: Initial   Physician Order: PT Eval and Treat    Presenting Problem: s/p L5-S1 microdecompression      Reason for Therapy: Mobility Dy assistance and outpatient PT services pending progress. Patient will benefit from continued IP PT services to address these deficits in preparation for discharge.     DISCHARGE RECOMMENDATIONS  PT Discharge Recommendations: Undetermined (Home with OP PT [address]   • CYSTOURETHROSCOPY  03/29/2013    Cysto- Dr. Andres Dela Cruz - bladder diverticulum   • EXC BACK LES SC = 3 CM Right 12/3/2014    Procedure: EXCISION OF TRUNK MASS;  Surgeon: Foster Browne MD;  Location: [facility] Location: [facility]   • TRIGGER FINGER RELEASE Right        HOME SITUATION  Home Situation  Type of Home: House  Home Layout: Multi-level  Stairs to Enter : 0  Stairs to Bedroom: 7  Railing: Yes  Lives With: Spouse  Drives: Yes  Patien wheelchair)?: A Little   Help from Another: Need to walk in hospital room?: Total   Help from Another: Climbing 3-5 steps with a railing?: Total     AM-PAC Score:  Raw Score: 16   Approx Degree of Impairment: 54.16%   Standardized Score (AM-PAC Scale): 40.

## 2022-01-11 VITALS
BODY MASS INDEX: 33.59 KG/M2 | RESPIRATION RATE: 18 BRPM | HEIGHT: 72 IN | HEART RATE: 76 BPM | OXYGEN SATURATION: 96 % | WEIGHT: 248 LBS | DIASTOLIC BLOOD PRESSURE: 90 MMHG | SYSTOLIC BLOOD PRESSURE: 149 MMHG | TEMPERATURE: 99 F

## 2022-01-11 PROBLEM — R20.2 NUMBNESS AND TINGLING OF RIGHT LOWER EXTREMITY: Status: RESOLVED | Noted: 2022-01-10 | Resolved: 2022-01-11

## 2022-01-11 PROBLEM — R20.0 NUMBNESS AND TINGLING OF RIGHT LOWER EXTREMITY: Status: RESOLVED | Noted: 2022-01-10 | Resolved: 2022-01-11

## 2022-01-11 LAB
ANION GAP SERPL CALC-SCNC: 4 MMOL/L (ref 0–18)
BUN BLD-MCNC: 17 MG/DL (ref 7–18)
BUN/CREAT SERPL: 15 (ref 10–20)
CALCIUM BLD-MCNC: 9.2 MG/DL (ref 8.5–10.1)
CHLORIDE SERPL-SCNC: 104 MMOL/L (ref 98–112)
CO2 SERPL-SCNC: 31 MMOL/L (ref 21–32)
CREAT BLD-MCNC: 1.13 MG/DL
GLUCOSE BLD-MCNC: 126 MG/DL (ref 70–99)
OSMOLALITY SERPL CALC.SUM OF ELEC: 291 MOSM/KG (ref 275–295)
POTASSIUM SERPL-SCNC: 4.1 MMOL/L (ref 3.5–5.1)
SODIUM SERPL-SCNC: 139 MMOL/L (ref 136–145)

## 2022-01-11 PROCEDURE — 80048 BASIC METABOLIC PNL TOTAL CA: CPT | Performed by: INTERNAL MEDICINE

## 2022-01-11 PROCEDURE — 97110 THERAPEUTIC EXERCISES: CPT

## 2022-01-11 PROCEDURE — 97116 GAIT TRAINING THERAPY: CPT

## 2022-01-11 PROCEDURE — 97530 THERAPEUTIC ACTIVITIES: CPT

## 2022-01-11 RX ORDER — LISINOPRIL 20 MG/1
40 TABLET ORAL EVERY MORNING
Status: DISCONTINUED | OUTPATIENT
Start: 2022-01-11 | End: 2022-01-11

## 2022-01-11 RX ORDER — TRIAMTERENE AND HYDROCHLOROTHIAZIDE 37.5; 25 MG/1; MG/1
1 CAPSULE ORAL EVERY MORNING
Status: DISCONTINUED | OUTPATIENT
Start: 2022-01-11 | End: 2022-01-11

## 2022-01-11 NOTE — PROGRESS NOTES
Spine Service Progress Note    24hrs/Events: Postop day #1 status post right L5-S1 microdecompression. Observed overnight secondary to right leg numbness and difficulty mobilizing.   Worked with PT yesterday not yet today    Subjective: Right leg pain patient to remove this and put his knee through range of motion is much as possible to avoid stiffness. Recommend working with physical therapy today.  -Activity: As tolerated. Frequent mobilization. Up to chair when not sleeping or ambulating.  Appreciate

## 2022-01-11 NOTE — PHYSICAL THERAPY NOTE
PHYSICAL THERAPY TREATMENT NOTE - INPATIENT     Room Number: Room 2/Room 2-A       Presenting Problem: s/p L5-S1 microdecompression        Problem List  Active Problems:    * No active hospital problems.  *      PHYSICAL THERAPY ASSESSMENT     Per RN reques How much help from another person does the patient currently need. ..    Help from Another: Moving to and from a bed to a chair (including a wheelchair)?: A Little   Help from Another: Need to walk in hospital room?: A Lot   Help from Another: Climbing 3-5 #6  Current Status

## 2022-01-11 NOTE — CONSULTS
[facility]       CC: No chief complaint on file.        Attending Physician: Jw Wong MD  Reason for Consultation: Medical Management  PCP: Loreto Valiente MD    History of Present Illness: Patient is a 66year old male with Cysto- Dr. Viki Du - bladder diverticulum   • EXC BACK LES SC = 3 CM Right 12/3/2014    Procedure: EXCISION OF TRUNK MASS;  Surgeon: Luetta Sacks, MD;  Location: [address] [place] [address]   • EXCIS MOUTH MUCOSA/SUB,SIMPL REPAIR Left 1/7/2015    Procedure: EXC RELEASE Right         ALL:    Dander                  ASTHMA  Amlodipine              OTHER (SEE COMMENTS)    Comment:Retain water     Home Medications:  tiZANidine HCl 4 MG Oral Cap, Take 1 capsule (4 mg total) by mouth 3 (three) times daily. , Disp: 30 ca Hx  Social History    Tobacco Use      Smoking status: Former Smoker        Packs/day: 1.00        Years: 10.00        Pack years: 10        Types: Cigarettes        Quit date: 1970        Years since quittin.6      Smokeless tobacco: Never Used hours.    Invalid input(s): LYM#, MONO#, BASOS#, EOSIN#    No results for input(s): NA, K, CL, CO2, BUN, CREATSERUM, GLU, CA, CAION, MG, PHOS in the last 168 hours. No results for input(s): ALT, AST, ALB, AMYLASE, LIPASE, LDH in the last 168 hours.     I Hyperlipidemia  -resume rosuvastatin       Mild intermittent asthma without complication  -stable, compensated  -albuterol PRN      FN:  - IVF: LR @ 100 ml/hr  - Diet: Cardiac    DVT Prophy: per ORTHO  Atrophy: PT/OT eval and Rx  Lines: PIV    Dispo: pendi

## 2022-01-11 NOTE — DISCHARGE SUMMARY
[facility] Discharge Summary     Patient ID:  Delma Espinal  66year old  2/7/1950    Admit date: 1/10/2022    Discharge date: 1/11/2022    Attending Physician: Derian Sneed MD     Consults: IP CONSULT TO HOSPITALIST    Primary Care ORTHO-Spine  -cont statin and beta-blocker        Essential hypertension, benign  -resume labetalol  -hold lisinopril and Dyazide, check BMP in AM, if BP elevated and renal function stable, will restart today, as renal function stable BP stable  -clonidine NEEDED  What changed: Another medication with the same name was added. Make sure you understand how and when to take each. * tiZANidine HCl 4 MG Caps  Commonly known as: ZANAFLEX  Take 1 capsule (4 mg total) by mouth 3 (three) times daily.   What change Mary Montiel MD.    Specialty: SURGERY, ORTHOPEDIC  Contact information:  [id number] MD In 1 week.     Specialty: Family Medicine  Contact information:  [address] JESSICA 300 plush chairs. Medium to firm chairs with straight backs give better support. • Recliners are OK but you may need to add pillows to avoid sinking into the chair. • Use a raised toilet seat if needed.   • Change position every 20-30 minutes throughout the to avoid napping during the day. • It is common to fatigue easily after surgery, this will typically improve one month after your surgery. • Maintain clean sheets and pillow cases. Return to work  • When cleared by surgeon.  Discuss specific work ac water to run over the site. • Dab the site dry with a clean towel. Diet and Constipation Prevention  • Your appetite may be poor. Your desire for food will return. • Drink plenty of liquids (water, juice) each day to prevent dehydration.   • Maintain over your wound as this can lead to swelling and increased pain. • Gentle stretching may ease muscle spasm. The idea is to “lengthen” the muscle that is in spasm. Avoid any aggressive bending, lifting, twisting with your neck or back.  Gently bending and are routinely seen 2 weeks, 6 weeks, 3 months, 6 months, and 1 year after surgery. • You will be scheduled for a post-surgical visit two weeks after surgery. Please confirm this appointment. • It is very important that you keep this appointment.   • We r hours    It is normal:  · For you to have a sore throat if you had a breathing tube during surgery (while you were asleep!). The sore throat should get better within 48 hours.  You can gargle with warm salt water (1/2 tsp in 4 oz warm water) or use a throat

## 2022-01-14 PROBLEM — M54.41 ACUTE MIDLINE LOW BACK PAIN WITH RIGHT-SIDED SCIATICA: Status: ACTIVE | Noted: 2022-01-14

## 2022-04-13 RX ORDER — ASPIRIN 81 MG/1
81 TABLET ORAL DAILY
COMMUNITY

## 2022-04-21 ENCOUNTER — HOSPITAL ENCOUNTER (OUTPATIENT)
Dept: GENERAL RADIOLOGY | Facility: HOSPITAL | Age: 72
Discharge: HOME | End: 2022-04-21
Attending: ORTHOPAEDIC SURGERY
Payer: MEDICARE

## 2022-04-21 ENCOUNTER — HOSPITAL ENCOUNTER (OUTPATIENT)
Dept: CT IMAGING | Facility: HOSPITAL | Age: 72
End: 2022-04-21
Attending: ORTHOPAEDIC SURGERY
Payer: MEDICARE

## 2022-04-21 VITALS
HEART RATE: 96 BPM | WEIGHT: 230 LBS | DIASTOLIC BLOOD PRESSURE: 89 MMHG | HEIGHT: 72 IN | BODY MASS INDEX: 31.15 KG/M2 | SYSTOLIC BLOOD PRESSURE: 142 MMHG | RESPIRATION RATE: 16 BRPM

## 2022-04-21 DIAGNOSIS — Z98.890 HISTORY OF LUMBAR SURGERY: ICD-10-CM

## 2022-04-21 DIAGNOSIS — M54.16 LUMBAR RADICULAR PAIN: ICD-10-CM

## 2022-04-21 PROCEDURE — 72100 X-RAY EXAM L-S SPINE 2/3 VWS: CPT | Performed by: ORTHOPAEDIC SURGERY

## 2022-04-21 RX ORDER — LIDOCAINE HYDROCHLORIDE 10 MG/ML
INJECTION, SOLUTION EPIDURAL; INFILTRATION; INTRACAUDAL; PERINEURAL
Status: DISCONTINUED
Start: 2022-04-21 | End: 2022-04-21 | Stop reason: WASHOUT

## 2022-04-21 NOTE — IMAGING NOTE
[id number] [facility] LUMBAR MYELOGRAM, PT CONFIRMED ALLERGIES, , AND NAME. MEDS REVIEWED , HELD ASA FOR 5 DAYS AND DENIED TAKING ANY BLOOD THINNING MEDICATIONS LAST 5 DAYS. PT HAS A RIDE HOME.

## 2022-05-05 ENCOUNTER — HOSPITAL ENCOUNTER (OUTPATIENT)
Dept: CT IMAGING | Facility: HOSPITAL | Age: 72
Discharge: HOME | End: 2022-05-05
Attending: ORTHOPAEDIC SURGERY
Payer: MEDICARE

## 2022-05-05 ENCOUNTER — HOSPITAL ENCOUNTER (OUTPATIENT)
Dept: GENERAL RADIOLOGY | Facility: HOSPITAL | Age: 72
Discharge: HOME | End: 2022-05-05
Attending: ORTHOPAEDIC SURGERY
Payer: MEDICARE

## 2022-05-05 VITALS
HEIGHT: 72 IN | SYSTOLIC BLOOD PRESSURE: 141 MMHG | DIASTOLIC BLOOD PRESSURE: 83 MMHG | WEIGHT: 240 LBS | BODY MASS INDEX: 32.51 KG/M2 | OXYGEN SATURATION: 96 % | HEART RATE: 59 BPM

## 2022-05-05 DIAGNOSIS — M54.16 LUMBAR RADICULAR PAIN: ICD-10-CM

## 2022-05-05 DIAGNOSIS — Z98.890 HISTORY OF LUMBAR SURGERY: ICD-10-CM

## 2022-05-05 PROCEDURE — 62304 MYELOGRAPHY LUMBAR INJECTION: CPT | Performed by: ORTHOPAEDIC SURGERY

## 2022-05-05 PROCEDURE — 72132 CT LUMBAR SPINE W/DYE: CPT | Performed by: ORTHOPAEDIC SURGERY

## 2022-05-05 RX ORDER — LIDOCAINE HYDROCHLORIDE 10 MG/ML
INJECTION, SOLUTION EPIDURAL; INFILTRATION; INTRACAUDAL; PERINEURAL
Status: COMPLETED
Start: 2022-05-05 | End: 2022-05-05

## 2022-05-05 RX ORDER — LIDOCAINE HYDROCHLORIDE 10 MG/ML
5 INJECTION, SOLUTION EPIDURAL; INFILTRATION; INTRACAUDAL; PERINEURAL ONCE
Status: COMPLETED | OUTPATIENT
Start: 2022-05-05 | End: 2022-05-05

## 2022-05-05 RX ADMIN — LIDOCAINE HYDROCHLORIDE 5 ML: 10 INJECTION, SOLUTION EPIDURAL; INFILTRATION; INTRACAUDAL; PERINEURAL at 12:00:00

## 2022-05-05 NOTE — IMAGING NOTE
1215 Received patient post Lumbar myelogram. Denies discomfort. 1240 Juice provided, crackers included and TV controls. VSS- see flow sheet. [address] [place] NO ACUTE DISCOMFORT. AVS SHEET PROVIDED.  DISCHARGE TO MAIN ENTRANCE VIA WHEELCHAIR- SEE VS FLOW SHEET

## 2022-09-30 ENCOUNTER — LAB ENCOUNTER (OUTPATIENT)
Dept: LAB | Age: 72
End: 2022-09-30
Attending: ORTHOPAEDIC SURGERY
Payer: MEDICARE

## 2022-09-30 ENCOUNTER — HOSPITAL ENCOUNTER (OUTPATIENT)
Dept: GENERAL RADIOLOGY | Age: 72
Discharge: HOME | End: 2022-09-30
Attending: ORTHOPAEDIC SURGERY
Payer: MEDICARE

## 2022-09-30 DIAGNOSIS — Z01.818 PREOPERATIVE TESTING: ICD-10-CM

## 2022-09-30 LAB
ANION GAP SERPL CALC-SCNC: 4 MMOL/L (ref 0–18)
BASOPHILS # BLD AUTO: 0.03 X10(3) UL (ref 0–0.2)
BASOPHILS NFR BLD AUTO: 0.5 %
BUN BLD-MCNC: 16 MG/DL (ref 7–18)
BUN/CREAT SERPL: 14.4 (ref 10–20)
CALCIUM BLD-MCNC: 8.8 MG/DL (ref 8.5–10.1)
CHLORIDE SERPL-SCNC: 106 MMOL/L (ref 98–112)
CO2 SERPL-SCNC: 31 MMOL/L (ref 21–32)
CREAT BLD-MCNC: 1.11 MG/DL
DEPRECATED RDW RBC AUTO: 45 FL (ref 35.1–46.3)
EOSINOPHIL # BLD AUTO: 0.22 X10(3) UL (ref 0–0.7)
EOSINOPHIL NFR BLD AUTO: 3.3 %
ERYTHROCYTE [DISTWIDTH] IN BLOOD BY AUTOMATED COUNT: 13.3 % (ref 11–15)
GFR SERPLBLD BASED ON 1.73 SQ M-ARVRAT: 70 ML/MIN/1.73M2 (ref 60–?)
GLUCOSE BLD-MCNC: 110 MG/DL (ref 70–99)
HCT VFR BLD AUTO: 44.7 %
HGB BLD-MCNC: 14.1 G/DL
IMM GRANULOCYTES # BLD AUTO: 0.02 X10(3) UL (ref 0–1)
IMM GRANULOCYTES NFR BLD: 0.3 %
LYMPHOCYTES # BLD AUTO: 0.92 X10(3) UL (ref 1–4)
LYMPHOCYTES NFR BLD AUTO: 13.9 %
MCH RBC QN AUTO: 29 PG (ref 26–34)
MCHC RBC AUTO-ENTMCNC: 31.5 G/DL (ref 31–37)
MCV RBC AUTO: 91.8 FL
MONOCYTES # BLD AUTO: 0.64 X10(3) UL (ref 0.1–1)
MONOCYTES NFR BLD AUTO: 9.7 %
NEUTROPHILS # BLD AUTO: 4.77 X10 (3) UL (ref 1.5–7.7)
NEUTROPHILS # BLD AUTO: 4.77 X10(3) UL (ref 1.5–7.7)
NEUTROPHILS NFR BLD AUTO: 72.3 %
OSMOLALITY SERPL CALC.SUM OF ELEC: 294 MOSM/KG (ref 275–295)
PLATELET # BLD AUTO: 224 10(3)UL (ref 150–450)
POTASSIUM SERPL-SCNC: 3.6 MMOL/L (ref 3.5–5.1)
RBC # BLD AUTO: 4.87 X10(6)UL
SODIUM SERPL-SCNC: 141 MMOL/L (ref 136–145)
WBC # BLD AUTO: 6.6 X10(3) UL (ref 4–11)

## 2022-09-30 PROCEDURE — 87641 MR-STAPH DNA AMP PROBE: CPT

## 2022-09-30 PROCEDURE — 85025 COMPLETE CBC W/AUTO DIFF WBC: CPT

## 2022-09-30 PROCEDURE — 36415 COLL VENOUS BLD VENIPUNCTURE: CPT

## 2022-09-30 PROCEDURE — 71046 X-RAY EXAM CHEST 2 VIEWS: CPT | Performed by: ORTHOPAEDIC SURGERY

## 2022-09-30 PROCEDURE — 80048 BASIC METABOLIC PNL TOTAL CA: CPT

## 2022-10-01 LAB
MRSA DNA SPEC QL NAA+PROBE: NEGATIVE
SARS-COV-2 RNA RESP QL NAA+PROBE: NOT DETECTED

## 2022-10-03 ENCOUNTER — ANESTHESIA (OUTPATIENT)
Dept: SURGERY | Facility: HOSPITAL | Age: 72
End: 2022-10-03
Payer: MEDICARE

## 2022-10-03 ENCOUNTER — HOSPITAL ENCOUNTER (OUTPATIENT)
Facility: HOSPITAL | Age: 72
Setting detail: HOSPITAL OUTPATIENT SURGERY
Discharge: HOME | End: 2022-10-03
Attending: ORTHOPAEDIC SURGERY | Admitting: ORTHOPAEDIC SURGERY
Payer: MEDICARE

## 2022-10-03 ENCOUNTER — ANESTHESIA EVENT (OUTPATIENT)
Dept: SURGERY | Facility: HOSPITAL | Age: 72
End: 2022-10-03
Payer: MEDICARE

## 2022-10-03 ENCOUNTER — APPOINTMENT (OUTPATIENT)
Dept: GENERAL RADIOLOGY | Facility: HOSPITAL | Age: 72
End: 2022-10-03
Attending: ORTHOPAEDIC SURGERY
Payer: MEDICARE

## 2022-10-03 VITALS
TEMPERATURE: 97 F | WEIGHT: 235 LBS | OXYGEN SATURATION: 95 % | SYSTOLIC BLOOD PRESSURE: 168 MMHG | HEART RATE: 64 BPM | RESPIRATION RATE: 18 BRPM | HEIGHT: 72 IN | BODY MASS INDEX: 31.83 KG/M2 | DIASTOLIC BLOOD PRESSURE: 74 MMHG

## 2022-10-03 DIAGNOSIS — Z01.818 PREOPERATIVE TESTING: Primary | ICD-10-CM

## 2022-10-03 PROCEDURE — 01NB0ZZ RELEASE LUMBAR NERVE, OPEN APPROACH: ICD-10-PCS | Performed by: ORTHOPAEDIC SURGERY

## 2022-10-03 PROCEDURE — 76000 FLUOROSCOPY <1 HR PHYS/QHP: CPT | Performed by: ORTHOPAEDIC SURGERY

## 2022-10-03 PROCEDURE — 0SB20ZZ EXCISION OF LUMBAR VERTEBRAL DISC, OPEN APPROACH: ICD-10-PCS | Performed by: ORTHOPAEDIC SURGERY

## 2022-10-03 RX ORDER — ONDANSETRON 2 MG/ML
INJECTION INTRAMUSCULAR; INTRAVENOUS AS NEEDED
Status: DISCONTINUED | OUTPATIENT
Start: 2022-10-03 | End: 2022-10-03 | Stop reason: SURG

## 2022-10-03 RX ORDER — GLYCOPYRROLATE 0.2 MG/ML
INJECTION, SOLUTION INTRAMUSCULAR; INTRAVENOUS AS NEEDED
Status: DISCONTINUED | OUTPATIENT
Start: 2022-10-03 | End: 2022-10-03 | Stop reason: SURG

## 2022-10-03 RX ORDER — METOCLOPRAMIDE 10 MG/1
10 TABLET ORAL ONCE
Status: COMPLETED | OUTPATIENT
Start: 2022-10-03 | End: 2022-10-03

## 2022-10-03 RX ORDER — HYDROMORPHONE HYDROCHLORIDE 1 MG/ML
0.4 INJECTION, SOLUTION INTRAMUSCULAR; INTRAVENOUS; SUBCUTANEOUS EVERY 5 MIN PRN
Status: DISCONTINUED | OUTPATIENT
Start: 2022-10-03 | End: 2022-10-03

## 2022-10-03 RX ORDER — NEOSTIGMINE METHYLSULFATE 1 MG/ML
INJECTION, SOLUTION INTRAVENOUS AS NEEDED
Status: DISCONTINUED | OUTPATIENT
Start: 2022-10-03 | End: 2022-10-03 | Stop reason: SURG

## 2022-10-03 RX ORDER — DEXAMETHASONE SODIUM PHOSPHATE 4 MG/ML
VIAL (ML) INJECTION AS NEEDED
Status: DISCONTINUED | OUTPATIENT
Start: 2022-10-03 | End: 2022-10-03 | Stop reason: SURG

## 2022-10-03 RX ORDER — HYDROMORPHONE HYDROCHLORIDE 1 MG/ML
0.2 INJECTION, SOLUTION INTRAMUSCULAR; INTRAVENOUS; SUBCUTANEOUS EVERY 5 MIN PRN
Status: DISCONTINUED | OUTPATIENT
Start: 2022-10-03 | End: 2022-10-03

## 2022-10-03 RX ORDER — NALOXONE HYDROCHLORIDE 0.4 MG/ML
80 INJECTION, SOLUTION INTRAMUSCULAR; INTRAVENOUS; SUBCUTANEOUS AS NEEDED
Status: DISCONTINUED | OUTPATIENT
Start: 2022-10-03 | End: 2022-10-03

## 2022-10-03 RX ORDER — SODIUM CHLORIDE, SODIUM LACTATE, POTASSIUM CHLORIDE, CALCIUM CHLORIDE 600; 310; 30; 20 MG/100ML; MG/100ML; MG/100ML; MG/100ML
INJECTION, SOLUTION INTRAVENOUS CONTINUOUS
Status: DISCONTINUED | OUTPATIENT
Start: 2022-10-03 | End: 2022-10-03

## 2022-10-03 RX ORDER — CEFAZOLIN SODIUM/WATER 2 G/20 ML
2 SYRINGE (ML) INTRAVENOUS ONCE
Status: COMPLETED | OUTPATIENT
Start: 2022-10-03 | End: 2022-10-03

## 2022-10-03 RX ORDER — LABETALOL HYDROCHLORIDE 5 MG/ML
10 INJECTION, SOLUTION INTRAVENOUS ONCE
Status: COMPLETED | OUTPATIENT
Start: 2022-10-03 | End: 2022-10-03

## 2022-10-03 RX ORDER — PHENYLEPHRINE HCL 10 MG/ML
VIAL (ML) INJECTION AS NEEDED
Status: DISCONTINUED | OUTPATIENT
Start: 2022-10-03 | End: 2022-10-03 | Stop reason: SURG

## 2022-10-03 RX ORDER — ACETAMINOPHEN 500 MG
1000 TABLET ORAL ONCE
Status: COMPLETED | OUTPATIENT
Start: 2022-10-03 | End: 2022-10-03

## 2022-10-03 RX ORDER — MORPHINE SULFATE 4 MG/ML
4 INJECTION, SOLUTION INTRAMUSCULAR; INTRAVENOUS EVERY 10 MIN PRN
Status: DISCONTINUED | OUTPATIENT
Start: 2022-10-03 | End: 2022-10-03

## 2022-10-03 RX ORDER — FAMOTIDINE 20 MG/1
20 TABLET, FILM COATED ORAL ONCE
Status: COMPLETED | OUTPATIENT
Start: 2022-10-03 | End: 2022-10-03

## 2022-10-03 RX ORDER — MORPHINE SULFATE 10 MG/ML
6 INJECTION, SOLUTION INTRAMUSCULAR; INTRAVENOUS EVERY 10 MIN PRN
Status: DISCONTINUED | OUTPATIENT
Start: 2022-10-03 | End: 2022-10-03

## 2022-10-03 RX ORDER — ROCURONIUM BROMIDE 10 MG/ML
INJECTION, SOLUTION INTRAVENOUS AS NEEDED
Status: DISCONTINUED | OUTPATIENT
Start: 2022-10-03 | End: 2022-10-03 | Stop reason: SURG

## 2022-10-03 RX ORDER — KETOROLAC TROMETHAMINE 30 MG/ML
INJECTION, SOLUTION INTRAMUSCULAR; INTRAVENOUS AS NEEDED
Status: DISCONTINUED | OUTPATIENT
Start: 2022-10-03 | End: 2022-10-03 | Stop reason: SURG

## 2022-10-03 RX ORDER — MORPHINE SULFATE 4 MG/ML
2 INJECTION, SOLUTION INTRAMUSCULAR; INTRAVENOUS EVERY 10 MIN PRN
Status: DISCONTINUED | OUTPATIENT
Start: 2022-10-03 | End: 2022-10-03

## 2022-10-03 RX ORDER — LIDOCAINE HYDROCHLORIDE 10 MG/ML
INJECTION, SOLUTION EPIDURAL; INFILTRATION; INTRACAUDAL; PERINEURAL AS NEEDED
Status: DISCONTINUED | OUTPATIENT
Start: 2022-10-03 | End: 2022-10-03 | Stop reason: SURG

## 2022-10-03 RX ORDER — HYDROMORPHONE HYDROCHLORIDE 1 MG/ML
0.6 INJECTION, SOLUTION INTRAMUSCULAR; INTRAVENOUS; SUBCUTANEOUS EVERY 5 MIN PRN
Status: DISCONTINUED | OUTPATIENT
Start: 2022-10-03 | End: 2022-10-03

## 2022-10-03 RX ADMIN — ROCURONIUM BROMIDE 50 MG: 10 INJECTION, SOLUTION INTRAVENOUS at 10:51:00

## 2022-10-03 RX ADMIN — PHENYLEPHRINE HCL 50 MCG: 10 MG/ML VIAL (ML) INJECTION at 10:51:00

## 2022-10-03 RX ADMIN — GLYCOPYRROLATE 1 MG: 0.2 INJECTION, SOLUTION INTRAMUSCULAR; INTRAVENOUS at 12:13:00

## 2022-10-03 RX ADMIN — LIDOCAINE HYDROCHLORIDE 50 MG: 10 INJECTION, SOLUTION EPIDURAL; INFILTRATION; INTRACAUDAL; PERINEURAL at 10:51:00

## 2022-10-03 RX ADMIN — PHENYLEPHRINE HCL 50 MCG: 10 MG/ML VIAL (ML) INJECTION at 11:52:00

## 2022-10-03 RX ADMIN — GLYCOPYRROLATE 0.2 MG: 0.2 INJECTION, SOLUTION INTRAMUSCULAR; INTRAVENOUS at 10:51:00

## 2022-10-03 RX ADMIN — ONDANSETRON 4 MG: 2 INJECTION INTRAMUSCULAR; INTRAVENOUS at 10:51:00

## 2022-10-03 RX ADMIN — SODIUM CHLORIDE, SODIUM LACTATE, POTASSIUM CHLORIDE, CALCIUM CHLORIDE: 600; 310; 30; 20 INJECTION, SOLUTION INTRAVENOUS at 12:11:00

## 2022-10-03 RX ADMIN — NEOSTIGMINE METHYLSULFATE 5 MG: 1 INJECTION, SOLUTION INTRAVENOUS at 12:13:00

## 2022-10-03 RX ADMIN — DEXAMETHASONE SODIUM PHOSPHATE 8 MG: 4 MG/ML VIAL (ML) INJECTION at 10:51:00

## 2022-10-03 RX ADMIN — KETOROLAC TROMETHAMINE 15 MG: 30 INJECTION, SOLUTION INTRAMUSCULAR; INTRAVENOUS at 10:51:00

## 2022-10-03 RX ADMIN — CEFAZOLIN SODIUM/WATER 2 G: 2 G/20 ML SYRINGE (ML) INTRAVENOUS at 11:08:00

## 2022-10-03 NOTE — ANESTHESIA PROCEDURE NOTES
Airway  Date/Time: 10/3/2022 10:57 AM  Urgency: Elective    Airway not difficult    General Information and Staff    Patient location during procedure: OR  Anesthesiologist: Kierra Sheppard MD  Resident/CRNA: Rosanne Dewitt CRNA  Performed: CRNA     Indications and Patient Condition  Indications for airway management: anesthesia  Spontaneous ventilation: present  Sedation level: deep  Preoxygenated: yes  Patient position: sniffing  MILS maintained throughout  Mask difficulty assessment: 2 - vent by mask + OA or adjuvant +/- NMBA  No planned trial extubation    Final Airway Details  Final airway type: endotracheal airway      Successful airway: ETT  Cuffed: yes   Successful intubation technique: direct laryngoscopy  Endotracheal tube insertion site: oral  Blade: Ifeanyi  Blade size: #3  ETT size (mm): 7.0    Cormack-Lehane Classification: grade I - full view of glottis  Placement verified by: chest auscultation and capnometry   Measured from: teeth  ETT to teeth (cm): 22  Number of attempts at approach: 1    Additional Comments  Atrauamatic / bite block in place

## 2022-10-03 NOTE — OPERATIVE REPORT
PATIENT  James Dominguez    DATE OF SURGERY  10/3/22    SURGEON   Jessica Burroughs MD    ASSISTANT  DALE Townsend    PREOPERATIVE DIAGNOSIS   Recurrent L5-S1 HNP right sided  L4-5 spinal stenosis  Right lumbar radiculopathy    POSTOPERATIVE DIAGNOSIS   Same    PROCEDURES   1. Revision microdiskectomy right L5-S1  2. Right L4-5 hemilaminotomy, foraminotomy with medial facetectomy  3. Use of fluoroscopy. 4. Use of intra-operative microscope. DESCRIPTION OF PROCEDURE   Bilateral thecal sac decompression was performed. Exiting nerve roots were identified. ANESTHESIA   General endotracheal    COMPLICATIONS   None. BLOOD LOSS   Minimal.     INDICATIONS   The patient is a 67year-old male who previously underwent a rigth sided L5-S1 microdiskectomy with another surgeon without improvement of his symptoms. He was found to have residual disk at L5-S1 and L4-5 lateral recess stenosis. The patient understood the risks of decompression versus decompression with lumbar fusion. The patient consented to the decompression procedure. SH understood the risks and benefits, including risks of failure to improve, neurologic deterioration, infection, durotomy, continued low back pain, and progression of her spondylolisthesis were explained to the patient at length. The patient also understood the risks of anesthesia which include possible stroke, MI, death. TECHNIQUE   The patient was brought to the operating room. General   anesthesia with endotracheal intubation was performed. The patient was positioned prone on the Jag spinal frame. Care was taken to ensure bony prominences   were well padded. The lower back was prepped and draped in the   Usual sterile fashion. A surgical timeout was performed according to the [facility] standards identifying the appropriate patient, surgical site, and surgical procedure. Under fluoroscopy, an incision was made just off the   midline overlying the L4-5 interlaminar window.  The incision continued through fascia with Bovie dissection. Sequential   dilators were advanced into the caudal edge of the 4 lamina   under fluoroscopy. A tubular retractor was then advanced and secured   to the operating table. Positioning of this was confirmed with   biplanar fluoroscopy. The microscope was brought into the field. Soft tissue was   cleared off bony edges of the interlaminar window. Using a katie, the   caudal edge of the 4 lamina was removed down to the ligamentum flavum. Bilateral decompression was performed as the full extent of the flavum was identified. Partial medial facetectomies were performed. The   ligamentum flavum was then penetrated with a microcurette and   removed piecemeal. This facilitated visualization of the both sides of the thecal sac and the underlying nerve roots. An angled hockey stick elevator was tracked underneath the thecal sac proximally and distally and ensured adequate decompression with no remaining fragments compressing the  nerve root or the thecal sac. At this point, we were satisfied with the extent of the decompression, the neural elements were seen to be free. The tubular dialator was moved to L5-S1 over the remaining facet. The medial facet was removed and the S1 nerve root was found under residual ligamentum. This was compressed and followed distally out the S1 foramen. The nerve root was mobilized and an HNP was found and removed. The wound was thoroughly irrigated and hemostasis was ensured. Sterile solution was infiltrated. Meticulous hemostasis was achieved. Fascia was approximated with 0 Vicryl suture. Subcutaneous tissue   and skin was approximated with suture. Sterile dressing applied. The patient returned to recovery in stable condition. I was present for and performed the entire procedure.

## (undated) DEVICE — SUT VICRYL 0 UR-6 J603H

## (undated) DEVICE — NON-ADHERENT DRESSING: Brand: TELFA

## (undated) DEVICE — SOL  .9 1000ML BTL

## (undated) DEVICE — SUTURE VICRYL 0 CT-1

## (undated) DEVICE — DRAPE SLUSH/WARMER W/DISC

## (undated) DEVICE — PROXIMATE SKIN STAPLERS (35 WIDE) CONTAINS 35 STAINLESS STEEL STAPLES (FIXED HEAD): Brand: PROXIMATE

## (undated) DEVICE — C-ARMOR C-ARM EQUIPMENT COVERS CLEAR STERILE UNIVERSAL FIT 12 PER CASE: Brand: C-ARMOR

## (undated) DEVICE — INSULATED BLADE ELECTRODE 6.5

## (undated) DEVICE — VIOLET BRAIDED (POLYGLACTIN 910), SYNTHETIC ABSORBABLE SUTURE: Brand: COATED VICRYL

## (undated) DEVICE — SURGICEL FIBULAR 2X4

## (undated) DEVICE — 3.0MM PRECISION NEURO (MATCH HEAD)

## (undated) DEVICE — BLADE ELECTRODE: Brand: EDGE

## (undated) DEVICE — DRAPE SHEET LARGE 76X55

## (undated) DEVICE — CAUTERY BLADE 2IN INS E1455

## (undated) DEVICE — WRAP COOLING BACK W/NO PILLOW

## (undated) DEVICE — SPK10186 WILSON TABLE KIT: Brand: SPK10186 WILSON TABLE KIT

## (undated) DEVICE — ADHESIVE MASTISOL 2/3CC VL

## (undated) DEVICE — FLOSEAL HEMOSTATIC MATRIX, 5ML: Brand: FLOSEAL HEMOSTATIC MATRIX

## (undated) DEVICE — PEN: MARKING STD PT 100/CS: Brand: MEDICAL ACTION INDUSTRIES

## (undated) DEVICE — KIT PATIENT CARE SPINAL TABLE

## (undated) DEVICE — SUCTION CANISTER, 3000CC,SAFELINER: Brand: DEROYAL

## (undated) DEVICE — GOWN SURG AERO BLUE PERF XLG

## (undated) DEVICE — GAUZE SPONGES,12 PLY: Brand: CURITY

## (undated) DEVICE — DRAPE SHEET LG

## (undated) DEVICE — 3M™ TEGADERM™ TRANSPARENT FILM DRESSING, 1626W, 4 IN X 4-3/4 IN (10 CM X 12 CM), 50 EACH/CARTON, 4 CARTON/CASE: Brand: 3M™ TEGADERM™

## (undated) DEVICE — ENCORE® LATEX ACCLAIM SIZE 7.5, STERILE LATEX POWDER-FREE SURGICAL GLOVE: Brand: ENCORE

## (undated) DEVICE — GAMMEX® PI HYBRID SIZE 8.5, STERILE POWDER-FREE SURGICAL GLOVE, POLYISOPRENE AND NEOPRENE BLEND: Brand: GAMMEX

## (undated) DEVICE — SUT VICRYL 3-0 SH J416H

## (undated) DEVICE — CLOSURE EXOFIN 1.0ML

## (undated) DEVICE — SUTURE VICRYL 2-0 CT-1

## (undated) DEVICE — GOWN SURG AERO BLUE PERF LG

## (undated) DEVICE — LAMINECTOMY: Brand: MEDLINE INDUSTRIES, INC.

## (undated) DEVICE — 3M™ STERI-DRAPE™ U-DRAPE 1015: Brand: STERI-DRAPE™

## (undated) DEVICE — SUTURE MONOCRYL 3-0 Y936H

## (undated) DEVICE — SUT MONOCRYL 3-0 PS-2 Y497G

## (undated) NOTE — LETTER
[address], [place]  Authorization for Invasive Procedures  1. I hereby authorize Dr. Zarina Mesa , my physician and whomever may be designated as the doctor's assistant, to perform the following operation and/or procedure: Lumbar Myelogram on Olivia Vanegas at [facility].    2. My physician has explained to me the nature and purpose of the operation or other procedure, possible alternative methods of treatment, the risks involved and the possibility of complications to me. I understand the probable consequences of declining the recommended procedure and the alternative methods of treatment. I acknowledge that no guarantee has been made as to the result that may be obtained. 3. I recognize that during the course of this operation or other procedure, unforeseen conditions may necessitate additional or different procedures than those listed above. I, therefore, further authorize and request that the above-named physician, his/her physician assistants, or designees perform such procedures as are, in his/her professional opinion, necessary and desirable. If I have a Do Not Attempt Resuscitation (DNAR) order in place, that status will be suspended while in the operating room, procedural suite, and during the recovery period unless otherwise explicitly stated by me (or a person authorized to consent on my behalf). The surgeon or my attending physician will determine when the applicable recovery period ends for purposes of reinstating the DNAR order. 4. Should the need arise during my operation or immediate post-operative period; I also consent to the administration of blood and/or blood products.  Further, I understand that despite careful testing and screening of blood and blood products, I may still be subject to ill effects as a result of recieving a blood transfusion an/or blood producst. The following are some, but not all, of the potential risks that can occur: fever and allergic reactions, hemolytic reactions, transmission of disease such as hepatitis, AIDS, cytomegalovirus (CMV), and flluid overload. In the event that I wish to have autologous transfusions of my own blood, or a directed donor transfusion, I will discuss this with my physician. 5. I consent to the photographing of the operations or procedures to be performed for the purposes of advancing medicine, science, and/or education, provided my identity is not revealed. If the procedure has been videotaped, the physician/surgeon will obtain the original videotape. The hospital will not be responsible for storage or maintenance of this tape. 6. I consent to the presence of a  or observer as deemed necessary by my physician or his designee. 7. Any tissues or organs removed in the operation or other procedure may be disposed of by and at the discretion of [facility].    8. I understand that the physician and his/her physician assistants may not be employees or agents of [facility], [facility], nor [facility], but are independent medical practitioners who have been permitted to use its facilities for the care and treatment of their patients. 9. Patients having a sterilization procedure: I understand that if the procedure is successful the results will be permanent and it will therefore be impossible for me to inseminate, conceive or bear children. I also understand that the procedure is intended to result in sterility, although the result has not been guaranteed. 10. I CERTIFY THAT I HAVE READ AND FULLY UNDERSTAND THE ABOVE CONSENT TO OPERATION and/or OTHER PROCEDURE. 11. I acknowledge that my physician has explained sedation/analgesia administration to me including the risks and benefits. I consent to the administration of sedation/analgesia as may be necessary or desirable in the judgment of my physician.      Signature of Patient:  ________________________________________________ Date: _________Time: _________    Responsible person in case of minor or unconscious: _____________________________Relationship: ____________     Witness Signature: ____________________________________________ Date: __________ Time: ___________    Statement of Physician  My signature below affirms that prior to the time of the procedure, I have explained to the patient and/or his legal representative, the risks and benefits involved in the proposed treatment and any reasonable alternative to the proposed treatment. I have also explained the risks and benefits involved in the refusal of the proposed treatment and have answered the patient's questions. If I have a significant financial interest in this procedure/surgery, I have disclosed this and had a discussion with my patient. Signature of Physician:   ________________________________________Date: _________Time:_______ Patient Name: Mark Amaya  : 1950   Printed:  May 5, 2022    Medical Record #: [id number]

## (undated) NOTE — LETTER
[address], [place]  Authorization for Invasive Procedures  1. I hereby authorize Dr. True Newell , my physician and whomever may be designated as the doctor's assistant, to perform the following operation and/or procedure:  Lumbar Myelogram on Hernan Hines at [facility].    2. My physician has explained to me the nature and purpose of the operation or other procedure, possible alternative methods of treatment, the risks involved and the possibility of complications to me. I understand the probable consequences of declining the recommended procedure and the alternative methods of treatment. I acknowledge that no guarantee has been made as to the result that may be obtained. 3. I recognize that during the course of this operation or other procedure, unforeseen conditions may necessitate additional or different procedures than those listed above. I, therefore, further authorize and request that the above-named physician, his/her physician assistants, or designees perform such procedures as are, in his/her professional opinion, necessary and desirable. If I have a Do Not Attempt Resuscitation (DNAR) order in place, that status will be suspended while in the operating room, procedural suite, and during the recovery period unless otherwise explicitly stated by me (or a person authorized to consent on my behalf). The surgeon or my attending physician will determine when the applicable recovery period ends for purposes of reinstating the DNAR order. 4. Should the need arise during my operation or immediate post-operative period; I also consent to the administration of blood and/or blood products.  Further, I understand that despite careful testing and screening of blood and blood products, I may still be subject to ill effects as a result of recieving a blood transfusion an/or blood producst. The following are some, but not all, of the potential risks that can occur: fever and allergic reactions, hemolytic reactions, transmission of disease such as hepatitis, AIDS, cytomegalovirus (CMV), and flluid overload. In the event that I wish to have autologous transfusions of my own blood, or a directed donor transfusion, I will discuss this with my physician. 5. I consent to the photographing of the operations or procedures to be performed for the purposes of advancing medicine, science, and/or education, provided my identity is not revealed. If the procedure has been videotaped, the physician/surgeon will obtain the original videotape. The hospital will not be responsible for storage or maintenance of this tape. 6. I consent to the presence of a  or observer as deemed necessary by my physician or his designee. 7. Any tissues or organs removed in the operation or other procedure may be disposed of by and at the discretion of [facility].    8. I understand that the physician and his/her physician assistants may not be employees or agents of [facility], [facility], nor [facility], but are independent medical practitioners who have been permitted to use its facilities for the care and treatment of their patients. 9. Patients having a sterilization procedure: I understand that if the procedure is successful the results will be permanent and it will therefore be impossible for me to inseminate, conceive or bear children. I also understand that the procedure is intended to result in sterility, although the result has not been guaranteed. 10. I CERTIFY THAT I HAVE READ AND FULLY UNDERSTAND THE ABOVE CONSENT TO OPERATION and/or OTHER PROCEDURE. 11. I acknowledge that my physician has explained sedation/analgesia administration to me including the risks and benefits. I consent to the administration of sedation/analgesia as may be necessary or desirable in the judgment of my physician. Signature of Patient:  ________________________________________________ Date: _________Time: _________    Responsible person in case of minor or unconscious: _____________________________Relationship: ____________     Witness Signature: ____________________________________________ Date: __________ Time: ___________    Statement of Physician  My signature below affirms that prior to the time of the procedure, I have explained to the patient and/or his legal representative, the risks and benefits involved in the proposed treatment and any reasonable alternative to the proposed treatment. I have also explained the risks and benefits involved in the refusal of the proposed treatment and have answered the patient's questions. If I have a significant financial interest in this procedure/surgery, I have disclosed this and had a discussion with my patient.     Signature of Physician:   ________________________________________Date: _________Time:_______ Patient Name: Willian Hu  : 1950   Printed: 2022    Medical Record #: [id number]